# Patient Record
Sex: MALE | Race: WHITE | HISPANIC OR LATINO | URBAN - METROPOLITAN AREA
[De-identification: names, ages, dates, MRNs, and addresses within clinical notes are randomized per-mention and may not be internally consistent; named-entity substitution may affect disease eponyms.]

---

## 2018-11-28 ENCOUNTER — EMERGENCY (EMERGENCY)
Facility: HOSPITAL | Age: 68
LOS: 1 days | Discharge: HOME | End: 2018-11-28
Attending: EMERGENCY MEDICINE
Payer: SELF-PAY

## 2018-11-28 VITALS
DIASTOLIC BLOOD PRESSURE: 70 MMHG | SYSTOLIC BLOOD PRESSURE: 138 MMHG | OXYGEN SATURATION: 95 % | RESPIRATION RATE: 17 BRPM | TEMPERATURE: 99 F | HEART RATE: 86 BPM

## 2018-11-28 VITALS
HEIGHT: 65 IN | OXYGEN SATURATION: 100 % | RESPIRATION RATE: 16 BRPM | SYSTOLIC BLOOD PRESSURE: 135 MMHG | WEIGHT: 199.96 LBS | TEMPERATURE: 98 F | HEART RATE: 100 BPM | DIASTOLIC BLOOD PRESSURE: 88 MMHG

## 2018-11-28 PROCEDURE — 29505 APPLICATION LONG LEG SPLINT: CPT | Mod: RT

## 2018-11-28 PROCEDURE — 20610 DRAIN/INJ JOINT/BURSA W/O US: CPT | Mod: RT

## 2018-11-28 PROCEDURE — 99284 EMERGENCY DEPT VISIT MOD MDM: CPT | Mod: 25

## 2018-11-28 PROCEDURE — 73562 X-RAY EXAM OF KNEE 3: CPT

## 2018-11-28 PROCEDURE — 73562 X-RAY EXAM OF KNEE 3: CPT | Mod: 26,RT

## 2018-11-28 PROCEDURE — 99053 MED SERV 10PM-8AM 24 HR FAC: CPT

## 2018-11-28 RX ORDER — TRAMADOL HYDROCHLORIDE 50 MG/1
50 TABLET ORAL ONCE
Qty: 0 | Refills: 0 | Status: DISCONTINUED | OUTPATIENT
Start: 2018-11-28 | End: 2018-11-28

## 2018-11-28 RX ORDER — INDOMETHACIN 50 MG
50 CAPSULE ORAL ONCE
Qty: 0 | Refills: 0 | Status: COMPLETED | OUTPATIENT
Start: 2018-11-28 | End: 2018-11-28

## 2018-11-28 RX ADMIN — Medication 50 MILLIGRAM(S): at 10:08

## 2018-11-28 RX ADMIN — Medication 40 MILLIGRAM(S): at 07:22

## 2018-11-28 RX ADMIN — Medication 50 MILLIGRAM(S): at 07:21

## 2018-11-28 RX ADMIN — TRAMADOL HYDROCHLORIDE 50 MILLIGRAM(S): 50 TABLET ORAL at 10:08

## 2018-11-28 RX ADMIN — TRAMADOL HYDROCHLORIDE 50 MILLIGRAM(S): 50 TABLET ORAL at 07:22

## 2018-11-28 NOTE — ED PROVIDER NOTE - MEDICAL DECISION MAKING DETAILS
69yo with hx gout presents with R knee pain typical of his gouty flares. Exam shows R knee swelling and pain with ROM. XR shows no fracture. Given tramadol, indomethacin and prednisone for pain. Will reassess.

## 2018-11-28 NOTE — CONSULT NOTE ADULT - SUBJECTIVE AND OBJECTIVE BOX
HPI: Patient is a 69yo M who PMHx of gout with R knee pain and swelling that has been worsening for the past 4 days.     PAST MEDICAL & SURGICAL HISTORY:  Gout    Review of systems: Non Contributory    MEDICATIONS  (STANDING):    Allergies: No known Allergies    Vital Signs Last 24 Hrs  T(C): 37.2 (28 Nov 2018 10:59), Max: 37.3 (28 Nov 2018 07:16)  T(F): 98.9 (28 Nov 2018 10:59), Max: 99.2 (28 Nov 2018 07:16)  HR: 86 (28 Nov 2018 10:59) (86 - 100)  BP: 138/70 (28 Nov 2018 10:59) (135/88 - 138/70)  BP(mean): --  RR: 17 (28 Nov 2018 10:59) (16 - 17)  SpO2: 95% (28 Nov 2018 10:59) (95% - 100%)    Physical Examination:    Musculoskeletal:         Neurovascularly Intact    RADIOLOGY & ADDITIONAL STUDIES:    ASSESSMENT:    PLAN/RECOMMENDATION:    FOLLOW UP: HPI: Patient is a 67yo M with PMHx of gout who presents to the ED c/o R knee pain and swelling that has been worsening for the past 4 days.     PAST MEDICAL & SURGICAL HISTORY:  Gout    Review of systems: Non Contributory    MEDICATIONS  (STANDING):    Allergies: No known Allergies    Vital Signs Last 24 Hrs  T(C): 37.2 (28 Nov 2018 10:59), Max: 37.3 (28 Nov 2018 07:16)  T(F): 98.9 (28 Nov 2018 10:59), Max: 99.2 (28 Nov 2018 07:16)  HR: 86 (28 Nov 2018 10:59) (86 - 100)  BP: 138/70 (28 Nov 2018 10:59) (135/88 - 138/70)  BP(mean): --  RR: 17 (28 Nov 2018 10:59) (16 - 17)  SpO2: 95% (28 Nov 2018 10:59) (95% - 100%)    Physical Examination:    Musculoskeletal:  Right knee: Positive tenderness to palpation of joint line, 3 + effusion, pain on flexion of the knee, decreased range of motion, positive Micheal maneuver.     Neurovascularly Intact    RADIOLOGY & ADDITIONAL STUDIES: X-ray of right knee done in the ER shows DJD.     ASSESSMENT: Right knee DJD, swelling and pain     PLAN/RECOMMENDATION: Under sterile condition, right knee was aspirated and about 40 CC of yellowish fluid was obtained, and was injected with 40 mg of Depomedrol and 5 CC of lidocaine. Patient tolerated procedure well and expressed relief of pain. A long leg splint/immobilizer was applied. Cane was given.     Apply ice. Take anti-inflammatory medication. Apply Voltaren gel.     Recommend MRI of right knee in case of continuation of symptoms.     FOLLOW UP: With office in 1-2 weeks

## 2018-11-28 NOTE — ED PROVIDER NOTE - PROGRESS NOTE DETAILS
Seen by Dr. Mendez who removed 40-50cc of fluid and injected knee with steroids, patient to f/u with Dr. Mendez.

## 2018-11-28 NOTE — ED ADULT NURSE NOTE - CHPI ED NUR SYMPTOMS NEG
no chills/no tingling/no dizziness/no weakness/no fever/no vomiting/no decreased eating/drinking/no nausea

## 2018-11-28 NOTE — ED PROVIDER NOTE - OBJECTIVE STATEMENT
67yo M with hx gout presents with R knee pain. Reports onset 4 days ago, now with increased swelling and pain with movement. Took ibuprofen at home with no relief. Pt reports he takes allopurinol daily to prevent gout flares. Reports previous flares usually affect knees and feet. Denies trauma. Denies fever or redness.

## 2020-03-19 NOTE — ED PROVIDER NOTE - CARE PROVIDER_API CALL
Cardiac
Fredrick Mendez), Orthopaedic Surgery  [address] [place] [address]  Phone: [phone]  Fax: [phone]
